# Patient Record
Sex: MALE | Race: WHITE | URBAN - METROPOLITAN AREA
[De-identification: names, ages, dates, MRNs, and addresses within clinical notes are randomized per-mention and may not be internally consistent; named-entity substitution may affect disease eponyms.]

---

## 2018-01-19 PROBLEM — M65.839 STENOSING TENOSYNOVITIS OF WRIST: Status: ACTIVE | Noted: 2018-01-09

## 2018-01-19 PROBLEM — N28.1 RENAL CYST, LEFT: Status: ACTIVE | Noted: 2018-01-19

## 2018-01-19 PROBLEM — M19.032 PRIMARY OSTEOARTHRITIS OF LEFT WRIST: Status: ACTIVE | Noted: 2018-01-09

## 2020-01-27 ENCOUNTER — HOSPITAL ENCOUNTER (OUTPATIENT)
Age: 65
Setting detail: SPECIMEN
Discharge: HOME | End: 2020-01-27
Payer: COMMERCIAL

## 2020-01-27 PROBLEM — R31.29 MICROHEMATURIA: Status: ACTIVE | Noted: 2020-01-27

## 2020-01-29 LAB — SURGICAL PATHOLOGY REPORT: NORMAL

## 2020-02-05 LAB — UROTHELIAL CANCER DETECTION: NORMAL

## 2022-12-01 LAB
BUN / CREAT RATIO: NORMAL
BUN BLDV-MCNC: 21 MG/DL
CREAT SERPL-MCNC: 1.37 MG/DL

## 2023-08-28 LAB — PROSTATE SPECIFIC ANTIGEN: 2.62 NG/ML

## 2023-08-30 DIAGNOSIS — N20.0 RENAL CALCULUS, BILATERAL: ICD-10-CM

## 2023-09-07 ENCOUNTER — OFFICE VISIT (OUTPATIENT)
Age: 68
End: 2023-09-07
Payer: COMMERCIAL

## 2023-09-07 VITALS — WEIGHT: 200 LBS | BODY MASS INDEX: 28.63 KG/M2 | HEIGHT: 70 IN

## 2023-09-07 DIAGNOSIS — N13.8 BENIGN PROSTATIC HYPERPLASIA WITH URINARY OBSTRUCTION: Primary | ICD-10-CM

## 2023-09-07 DIAGNOSIS — N20.0 RENAL CALCULUS, BILATERAL: ICD-10-CM

## 2023-09-07 DIAGNOSIS — N40.1 BENIGN PROSTATIC HYPERPLASIA WITH URINARY OBSTRUCTION: Primary | ICD-10-CM

## 2023-09-07 LAB
BILIRUBIN, POC: NORMAL
BLOOD URINE, POC: NORMAL
CLARITY, POC: CLEAR
COLOR, POC: YELLOW
GLUCOSE URINE, POC: NORMAL
KETONES, POC: NORMAL
LEUKOCYTE EST, POC: NORMAL
NITRITE, POC: NORMAL
PH, POC: NORMAL
PROTEIN, POC: NORMAL
SPECIFIC GRAVITY, POC: NORMAL
UROBILINOGEN, POC: NORMAL

## 2023-09-07 PROCEDURE — 81003 URINALYSIS AUTO W/O SCOPE: CPT | Performed by: SPECIALIST

## 2023-09-07 PROCEDURE — 99214 OFFICE O/P EST MOD 30 MIN: CPT | Performed by: SPECIALIST

## 2023-09-07 PROCEDURE — 1123F ACP DISCUSS/DSCN MKR DOCD: CPT | Performed by: SPECIALIST

## 2024-10-14 ENCOUNTER — OFFICE VISIT (OUTPATIENT)
Age: 69
End: 2024-10-14
Payer: COMMERCIAL

## 2024-10-14 VITALS — BODY MASS INDEX: 28.63 KG/M2 | WEIGHT: 200 LBS | HEIGHT: 70 IN

## 2024-10-14 DIAGNOSIS — N13.8 BENIGN PROSTATIC HYPERPLASIA WITH URINARY OBSTRUCTION: Primary | ICD-10-CM

## 2024-10-14 DIAGNOSIS — R35.1 NOCTURIA: ICD-10-CM

## 2024-10-14 DIAGNOSIS — N40.1 BENIGN PROSTATIC HYPERPLASIA WITH URINARY OBSTRUCTION: Primary | ICD-10-CM

## 2024-10-14 DIAGNOSIS — N20.0 RENAL CALCULUS, BILATERAL: ICD-10-CM

## 2024-10-14 LAB
BILIRUBIN, POC: NORMAL
BLOOD URINE, POC: NORMAL
CLARITY, POC: CLEAR
COLOR, POC: YELLOW
GLUCOSE URINE, POC: NORMAL MG/DL
KETONES, POC: NORMAL
LEUKOCYTE EST, POC: NORMAL
NITRITE, POC: NORMAL
PH, POC: NORMAL
PROTEIN, POC: NORMAL MG/DL
SPECIFIC GRAVITY, POC: NORMAL
UROBILINOGEN, POC: NORMAL

## 2024-10-14 PROCEDURE — 99214 OFFICE O/P EST MOD 30 MIN: CPT | Performed by: SPECIALIST

## 2024-10-14 PROCEDURE — 1123F ACP DISCUSS/DSCN MKR DOCD: CPT | Performed by: SPECIALIST

## 2024-10-14 PROCEDURE — 81003 URINALYSIS AUTO W/O SCOPE: CPT | Performed by: SPECIALIST

## 2024-10-14 RX ORDER — MELOXICAM 15 MG/1
15 TABLET ORAL EVERY MORNING
COMMUNITY
Start: 2024-09-06

## 2024-10-14 RX ORDER — ETODOLAC 400 MG
400 TABLET ORAL
COMMUNITY
Start: 2024-02-05

## 2024-10-14 NOTE — PROGRESS NOTES
Jimmy Ferrera MD FACS    [facility] Urology Office Progress Note    Patient:  Jose Denny  YOB: 1955  Date: 10/14/2024    HISTORY OF PRESENT ILLNESS:   The patient is a 69 y.o. male  His lower urinary tract symptoms (\"BPH\") are not bothersome enough to warrant medical Rx.  Patient has nocturia x 4.   Patient instructed to limit fluid intake 2-3 hours prior to bedtime to minimize nocturia or nocturnal incontinence.   Patient has known bilateral kidney stones although on 9/26/24 KUB only shows a 3-4 mm Left non-obstructing kidney stone.  No flank pain or gross hematuria to suggest symptomatic recurrent kidney stones.    Lower urinary tract symptoms: frequency, hesitancy, decreased urinary stream, nocturia, 4 times per night, and incomplete emtpying   Last AUA Symptom Score (QOL): 8 (2)  Today's AUA Symptom Score (QOL): 9 (3)    Summary of old records:   Kidney stones: h/o left 5 mm proximal ureteral calculus on 2/26/16 KUB, Left ESWL 3/17/16; 6 mm R mid pole KS on 10/31/16 KUB; 1/2/18, 8/27/20 KUB: Right 5 mm KS, Left 4 mm KS; 9/1/21, 8/30/22, 8/28/23 KUB (L=4, R=6 mm); 9/26/24 KUB: left 3-4 mm KS, ?R KS  5/25/16 24 hour urine: vol=750 mL, normal Ca, Na, uric acid  Left renal cyst, 2.2 cm on 1/11/18 renal U/S, 1.3 cm on 10/10/18 U/S  Microhematuria with smoking hx: 2/6/20 CT-b/l KS (5mm L, 7 mm R), b/l renal cysts, BPH; 2/10/20 cysto: BPH  BPH    Additional History: none    Procedures Today: N/A    Urinalysis today:  Results for orders placed or performed in visit on 10/14/24   POCT Urinalysis No Micro (Auto)   Result Value Ref Range    Color, UA yellow     Clarity, UA clear     Glucose, UA POC >=1000 mg/dl mg/dL    Bilirubin, UA      Ketones, UA      Spec Grav, UA      Blood, UA POC neg     pH, UA      Protein, UA POC neg mg/dL    Urobilinogen, UA      Leukocytes, UA neg     Nitrite, UA neg        Last several PSA's:  Lab Results   Component Value Date    PSA 2.64 09/25/2024    PSA